# Patient Record
(demographics unavailable — no encounter records)

---

## 2024-11-01 NOTE — PHYSICAL EXAM
[0] : left foot posterior tibialis 0 [1+] : left foot dorsalis pedis 1+ [Vibration Dec.] : diminished vibratory sensation at the level of the toes [Position Sense Dec.] : diminished position sense at the level of the toes [Diminished Throughout Right Foot] : diminished sensation with monofilament testing throughout right foot [Diminished Throughout Left Foot] : diminished sensation with monofilament testing throughout left foot [FreeTextEntry3] : Temperature gradient warm to cool. [de-identified] : Intrinsic atrophy with arthritic hammertoes. [FreeTextEntry1] : [place]-Inge monofilament testing absent at the hallux, 1st MPJ and heel bilateral. Claudication. The patient has a class finding of Q9-One Class B and 2 Class C findings.

## 2024-11-01 NOTE — ASSESSMENT
[FreeTextEntry1] : Impression: Diabetes with neuropathy (E11.40). IPK (L85.1). Onychomycosis (B35.1). Pain in toes (M79.674)  Treatment: The foot was prepped with alcohol, and I trimmed keratotic lesions x4. I aggressively manually and mechanically debrided and debulked mycotic nails x4 using a small straight nail splitter and rotary natasha. Lotion was applied to the foot and antifungal to the nails. Patient was ePrescribed Ciclopirox. Patient has a high-risk foot due to neuropathy. Discussed diabetic foot care and diabetic neuropathy. Shoes are noted to be orthopedic and adequate.  Follow-up in in 3 months.

## 2024-11-01 NOTE — HISTORY OF PRESENT ILLNESS
[FreeTextEntry1] : Patient presents today diabetic foot care. She has a high-risk foot due to diabetic neuropathy. She complains of areas of keratosis and dystrophic and onychomycotic nails. They are painful despite neuropathy.  The patient's history and physical has been reviewed and verified with no changes.

## 2024-11-01 NOTE — PHYSICAL EXAM
[0] : left foot posterior tibialis 0 [1+] : left foot dorsalis pedis 1+ [Vibration Dec.] : diminished vibratory sensation at the level of the toes [Position Sense Dec.] : diminished position sense at the level of the toes [Diminished Throughout Right Foot] : diminished sensation with monofilament testing throughout right foot [Diminished Throughout Left Foot] : diminished sensation with monofilament testing throughout left foot [FreeTextEntry3] : Temperature gradient warm to cool. [de-identified] : Intrinsic atrophy with arthritic hammertoes. [FreeTextEntry1] : [place]-Inge monofilament testing absent at the hallux, 1st MPJ and heel bilateral. Claudication. The patient has a class finding of Q9-One Class B and 2 Class C findings.

## 2025-02-03 NOTE — PHYSICAL EXAM
[0] : left foot posterior tibialis 0 [1+] : left foot dorsalis pedis 1+ [Vibration Dec.] : diminished vibratory sensation at the level of the toes [Position Sense Dec.] : diminished position sense at the level of the toes [Diminished Throughout Right Foot] : diminished sensation with monofilament testing throughout right foot [Diminished Throughout Left Foot] : diminished sensation with monofilament testing throughout left foot [FreeTextEntry3] : Temperature gradient warm to cool. [de-identified] : Intrinsic atrophy with arthritic hammertoes. [FreeTextEntry1] : [place]-Inge monofilament testing absent at the hallux, 1st MPJ and heel bilateral. Claudication. The patient has a class finding of Q9-One Class B and 2 Class C findings.

## 2025-02-03 NOTE — HISTORY OF PRESENT ILLNESS
[FreeTextEntry1] : Patient presents today for high-risk foot care. She has worsening neuropathy. A1c is 6.5. Fasting sugar is 103. She has preulcerative keratotic lesions and thickened, mycotic nails that she cannot care for herself. They cause her pain despite neuropathy. The patient's history and physical has been reviewed and verified with no changes.

## 2025-02-03 NOTE — PHYSICAL EXAM
[0] : left foot posterior tibialis 0 [1+] : left foot dorsalis pedis 1+ [Vibration Dec.] : diminished vibratory sensation at the level of the toes [Position Sense Dec.] : diminished position sense at the level of the toes [Diminished Throughout Right Foot] : diminished sensation with monofilament testing throughout right foot [Diminished Throughout Left Foot] : diminished sensation with monofilament testing throughout left foot [FreeTextEntry3] : Temperature gradient warm to cool. [de-identified] : Intrinsic atrophy with arthritic hammertoes. [FreeTextEntry1] : [place]-Inge monofilament testing absent at the hallux, 1st MPJ and heel bilateral. Claudication. The patient has a class finding of Q9-One Class B and 2 Class C findings.

## 2025-02-03 NOTE — ASSESSMENT
[FreeTextEntry1] : Impression: Diabetes with neuropathy. IPK. Onychomycosis. Pain in toes.  Treatment: The foot was prepped with alcohol, and I trimmed keratotic lesions x4. I aggressively manually and mechanically debrided and debulked mycotic nails x4 using a small straight nail splitter and rotary natasha. Lotion was applied to the foot and antifungal to the nails. Patient was ePrescribed Ciclopirox. Patient has a high-risk foot due to neuropathy. Discussed diabetic foot care and diabetic neuropathy. Discussed getting memory foam step-in shoes instead the ones she is wearing. She should go today.  Follow-up in in 3 months.

## 2025-04-22 NOTE — PHYSICAL EXAM
[0] : left foot posterior tibialis 0 [1+] : left foot dorsalis pedis 1+ [Vibration Dec.] : diminished vibratory sensation at the level of the toes [Position Sense Dec.] : diminished position sense at the level of the toes [Diminished Throughout Right Foot] : diminished sensation with monofilament testing throughout right foot [Diminished Throughout Left Foot] : diminished sensation with monofilament testing throughout left foot [FreeTextEntry3] : Temperature gradient warm to cool. [de-identified] : Intrinsic atrophy with arthritic hammertoes. Progressing neuropathy. [FreeTextEntry1] : [place]-Inge monofilament testing absent at the hallux, 1st MPJ and heel bilateral. Claudication. The patient has a class finding of Q9-One Class B and 2 Class C findings.

## 2025-04-22 NOTE — PHYSICAL EXAM
[0] : left foot posterior tibialis 0 [1+] : left foot dorsalis pedis 1+ [Vibration Dec.] : diminished vibratory sensation at the level of the toes [Position Sense Dec.] : diminished position sense at the level of the toes [Diminished Throughout Right Foot] : diminished sensation with monofilament testing throughout right foot [Diminished Throughout Left Foot] : diminished sensation with monofilament testing throughout left foot [FreeTextEntry3] : Temperature gradient warm to cool. [de-identified] : Intrinsic atrophy with arthritic hammertoes. Progressing neuropathy. [FreeTextEntry1] : [place]-Inge monofilament testing absent at the hallux, 1st MPJ and heel bilateral. Claudication. The patient has a class finding of Q9-One Class B and 2 Class C findings.

## 2025-04-22 NOTE — ASSESSMENT
[FreeTextEntry1] : Impression: Diabetes with neuropathy. IPK. Onychomycosis. Pain in toes.  Treatment: The foot was prepped with alcohol, and I trimmed keratotic lesions x5. I aggressively manually and mechanically debrided and debulked mycotic nails x4 using a small straight nail splitter and rotary natasha. Remainder of nails were debrided and trimmed to hygienic length. Lotion was applied to the foot and antifungal to the nails. I stressed the importance of getting out of the unacceptable shoes she has and wearing memory foam sneakers. She should inspect the feet daily.. Discussed diabetic foot care and diabetic neuropathy. Follow-up in in 3 months for high-risk foot care.

## 2025-04-22 NOTE — PHYSICAL EXAM
[0] : left foot posterior tibialis 0 [1+] : left foot dorsalis pedis 1+ [Vibration Dec.] : diminished vibratory sensation at the level of the toes [Position Sense Dec.] : diminished position sense at the level of the toes [Diminished Throughout Right Foot] : diminished sensation with monofilament testing throughout right foot [Diminished Throughout Left Foot] : diminished sensation with monofilament testing throughout left foot [FreeTextEntry3] : Temperature gradient warm to cool. [de-identified] : Intrinsic atrophy with arthritic hammertoes. Progressing neuropathy. [FreeTextEntry1] : [place]-Inge monofilament testing absent at the hallux, 1st MPJ and heel bilateral. Claudication. The patient has a class finding of Q9-One Class B and 2 Class C findings.

## 2025-04-22 NOTE — HISTORY OF PRESENT ILLNESS
[FreeTextEntry1] : Patient presents today for high-risk foot care due to neuropathy. She has preulcerative keratotic lesions and thickened, mycotic nails that she cannot care for herself.  The patient's history and physical has been reviewed and verified with no changes.

## 2025-07-30 NOTE — PHYSICAL EXAM
[0] : left foot posterior tibialis 0 [1+] : left foot dorsalis pedis 1+ [FreeTextEntry3] : Temperature gradient warm to cool. [de-identified] : Semi-rigid arthritic hammertoes with intrinsic muscular atrophy. [Vibration Dec.] : diminished vibratory sensation at the level of the toes [Position Sense Dec.] : diminished position sense at the level of the toes [Diminished Throughout Right Foot] : diminished sensation with monofilament testing throughout right foot [Diminished Throughout Left Foot] : diminished sensation with monofilament testing throughout left foot [FreeTextEntry1] : [place]-Inge monofilament testing absent at the hallux, 1st MPJ and heel bilateral. Claudication. The patient has a class finding of Q9-One Class B and 2 Class C findings.

## 2025-07-30 NOTE — PHYSICAL EXAM
[0] : left foot posterior tibialis 0 [1+] : left foot dorsalis pedis 1+ [FreeTextEntry3] : Temperature gradient warm to cool. [de-identified] : Semi-rigid arthritic hammertoes with intrinsic muscular atrophy. [Vibration Dec.] : diminished vibratory sensation at the level of the toes [Position Sense Dec.] : diminished position sense at the level of the toes [Diminished Throughout Right Foot] : diminished sensation with monofilament testing throughout right foot [Diminished Throughout Left Foot] : diminished sensation with monofilament testing throughout left foot [FreeTextEntry1] : [place]-Inge monofilament testing absent at the hallux, 1st MPJ and heel bilateral. Claudication. The patient has a class finding of Q9-One Class B and 2 Class C findings.

## 2025-07-30 NOTE — HISTORY OF PRESENT ILLNESS
[FreeTextEntry1] : Patient presents today for evaluation. She has diabetic neuropathy, progressing hammertoes, and worsening keratosis. She has onychomycosis of the hallux nails bilateral, right side worse than left. She has a high-risk foot and cannot care for this herself.  The patient's history and physical has been reviewed and verified with no changes.

## 2025-07-30 NOTE — PHYSICAL EXAM
[0] : left foot posterior tibialis 0 [1+] : left foot dorsalis pedis 1+ [FreeTextEntry3] : Temperature gradient warm to cool. [de-identified] : Semi-rigid arthritic hammertoes with intrinsic muscular atrophy. [Vibration Dec.] : diminished vibratory sensation at the level of the toes [Position Sense Dec.] : diminished position sense at the level of the toes [Diminished Throughout Right Foot] : diminished sensation with monofilament testing throughout right foot [Diminished Throughout Left Foot] : diminished sensation with monofilament testing throughout left foot [FreeTextEntry1] : [place]-Inge monofilament testing absent at the hallux, 1st MPJ and heel bilateral. Claudication. The patient has a class finding of Q9-One Class B and 2 Class C findings.

## 2025-07-30 NOTE — ASSESSMENT
[FreeTextEntry1] : Impression: Diabetes with neuropathy. IPK. Onychomycosis. Pain.  Treatment: After prepping with alcohol, I trimmed keratotic lesions x6 or 7 without incident. I manually and mechanically debrided hallux mycotic nails using a small straight nail splitter and rotary natasha.  I made her toe crest pads. She will continue good memory foam shoes and assistive device. Follow-up in the office for evaluation.  I want her to buy OTC toe crest pads.